# Patient Record
Sex: FEMALE | Race: ASIAN | Employment: FULL TIME | URBAN - METROPOLITAN AREA
[De-identification: names, ages, dates, MRNs, and addresses within clinical notes are randomized per-mention and may not be internally consistent; named-entity substitution may affect disease eponyms.]

---

## 2017-01-03 ENCOUNTER — OFFICE VISIT (OUTPATIENT)
Dept: INTERNAL MEDICINE CLINIC | Facility: CLINIC | Age: 27
End: 2017-01-03

## 2017-01-03 VITALS
SYSTOLIC BLOOD PRESSURE: 116 MMHG | DIASTOLIC BLOOD PRESSURE: 83 MMHG | TEMPERATURE: 98 F | BODY MASS INDEX: 31 KG/M2 | RESPIRATION RATE: 20 BRPM | HEART RATE: 75 BPM | WEIGHT: 187.88 LBS

## 2017-01-03 DIAGNOSIS — R51.9 HEADACHE, UNSPECIFIED HEADACHE TYPE: Primary | ICD-10-CM

## 2017-01-03 DIAGNOSIS — L65.9 HAIR LOSS: ICD-10-CM

## 2017-01-03 PROBLEM — R51 HEADACHE: Status: ACTIVE | Noted: 2017-01-03

## 2017-01-03 PROCEDURE — 99212 OFFICE O/P EST SF 10 MIN: CPT | Performed by: INTERNAL MEDICINE

## 2017-01-03 PROCEDURE — 99214 OFFICE O/P EST MOD 30 MIN: CPT | Performed by: INTERNAL MEDICINE

## 2017-01-03 RX ORDER — MELOXICAM 7.5 MG/1
7.5 TABLET ORAL DAILY
Qty: 30 TABLET | Refills: 0 | Status: SHIPPED | OUTPATIENT
Start: 2017-01-03

## 2017-01-03 NOTE — PROGRESS NOTES
HPI:    Patient ID: Agatha Cushing is a 31year old female. A very pleasant 30 y/o female patient comes to the office with a c/o headache. Headache   This is a new problem.  The current episode started more than 1 month ago (beginning of the October 201 Current Outpatient Prescriptions:  Meloxicam 7.5 MG Oral Tab Take 1 tablet (7.5 mg total) by mouth daily.  Disp: 30 tablet Rfl: 0   Ketoconazole 2 % Apply Externally Shampoo 2-3 x weekly as directed Disp: 120 mL Rfl: 10     Allergie This Visit:  Signed Prescriptions Disp Refills    Meloxicam 7.5 MG Oral Tab 30 tablet 0      Sig: Take 1 tablet (7.5 mg total) by mouth daily. Imaging & Referrals:  [facility] - INTERNAL       [place]#[id number]    Attention:  This note has been scribed by Theo MINOR

## 2017-01-27 ENCOUNTER — OFFICE VISIT (OUTPATIENT)
Dept: DERMATOLOGY CLINIC | Facility: CLINIC | Age: 27
End: 2017-01-27

## 2017-01-27 DIAGNOSIS — L65.9 ALOPECIA: ICD-10-CM

## 2017-01-27 DIAGNOSIS — L63.9 ALOPECIA AREATA: Primary | ICD-10-CM

## 2017-01-27 PROCEDURE — 99213 OFFICE O/P EST LOW 20 MIN: CPT | Performed by: DERMATOLOGY

## 2017-01-27 PROCEDURE — 99212 OFFICE O/P EST SF 10 MIN: CPT | Performed by: DERMATOLOGY

## 2017-01-27 RX ORDER — CLOBETASOL PROPIONATE 0.46 MG/ML
1 SOLUTION TOPICAL 2 TIMES DAILY
Qty: 50 ML | Refills: 6 | Status: SHIPPED | OUTPATIENT
Start: 2017-01-27 | End: 2018-01-27

## 2017-02-12 NOTE — PROGRESS NOTES
David Fitzgerald is a 31year old female. Patient presents with:  Hair/Scalp Problem: Patient presents with hair loss/bald patches. Patient gets migraines and notices hair loss where head hurts.              Review of patient's allergies indicates no Topics Concern    Caffeine Concern Yes    Comment: Coffee, 1 cup/week    Pt has a pacemaker No    Pt has a defibrillator No    Breast feeding No    Reaction to local anesthetic No     Social History Narrative     Family History   Problem Relation Age of On improving. Topical steroids o discrete to inject at this time. Biopsy not likely to be helpful.   Minimal inflammatory changes no evidence of scarring Verall hair regimen discussed including B. vitamin supplementation, iron as appropriate, adequate protei

## 2017-06-29 ENCOUNTER — OFFICE VISIT (OUTPATIENT)
Dept: INTERNAL MEDICINE CLINIC | Facility: CLINIC | Age: 27
End: 2017-06-29

## 2017-06-29 VITALS
TEMPERATURE: 98 F | HEIGHT: 66 IN | SYSTOLIC BLOOD PRESSURE: 97 MMHG | WEIGHT: 174 LBS | HEART RATE: 62 BPM | BODY MASS INDEX: 27.97 KG/M2 | DIASTOLIC BLOOD PRESSURE: 67 MMHG

## 2017-06-29 DIAGNOSIS — M79.604 RIGHT LEG PAIN: Primary | ICD-10-CM

## 2017-06-29 PROCEDURE — 99212 OFFICE O/P EST SF 10 MIN: CPT | Performed by: INTERNAL MEDICINE

## 2017-06-29 PROCEDURE — 99214 OFFICE O/P EST MOD 30 MIN: CPT | Performed by: INTERNAL MEDICINE

## 2017-06-29 NOTE — PROGRESS NOTES
HPI:    Patient ID: Doc Degree is a 31year old female. Leg Pain    The pain is present in the right knee, right lower leg and right ankle (noted on the anterolateral aspect of the right leg).  This is a chronic (right knee down to the right foot) proble present. No thyromegaly present. Cardiovascular: Normal rate, regular rhythm and normal heart sounds. No murmur heard. Pulmonary/Chest: Effort normal and breath sounds normal. No respiratory distress. She has no wheezes. She has no rales.    Abdominal

## 2017-07-26 ENCOUNTER — APPOINTMENT (OUTPATIENT)
Dept: LAB | Age: 27
End: 2017-07-26
Attending: OBSTETRICS & GYNECOLOGY
Payer: COMMERCIAL

## 2017-07-26 ENCOUNTER — OFFICE VISIT (OUTPATIENT)
Dept: OBGYN CLINIC | Facility: CLINIC | Age: 27
End: 2017-07-26

## 2017-07-26 VITALS
SYSTOLIC BLOOD PRESSURE: 120 MMHG | WEIGHT: 174 LBS | HEIGHT: 65 IN | BODY MASS INDEX: 28.99 KG/M2 | DIASTOLIC BLOOD PRESSURE: 84 MMHG | HEART RATE: 83 BPM

## 2017-07-26 DIAGNOSIS — R30.0 DYSURIA: ICD-10-CM

## 2017-07-26 DIAGNOSIS — Z34.91 UNCERTAIN DATES, ANTEPARTUM, FIRST TRIMESTER: Primary | ICD-10-CM

## 2017-07-26 DIAGNOSIS — Z32.01 PREGNANCY EXAMINATION OR TEST, POSITIVE RESULT: ICD-10-CM

## 2017-07-26 DIAGNOSIS — Z11.3 SCREEN FOR STD (SEXUALLY TRANSMITTED DISEASE): ICD-10-CM

## 2017-07-26 LAB
BILIRUB UR QL: NEGATIVE
CLARITY UR: CLEAR
COLOR UR: YELLOW
GLUCOSE UR-MCNC: NEGATIVE MG/DL
HGB UR QL STRIP.AUTO: NEGATIVE
KETONES UR-MCNC: NEGATIVE MG/DL
LEUKOCYTE ESTERASE UR QL STRIP.AUTO: NEGATIVE
NITRITE UR QL STRIP.AUTO: NEGATIVE
PH UR: 7 [PH] (ref 5–8)
PROT UR-MCNC: NEGATIVE MG/DL
SP GR UR STRIP: 1.01 (ref 1–1.03)
UROBILINOGEN UR STRIP-ACNC: <2
VIT C UR-MCNC: NEGATIVE MG/DL

## 2017-07-26 PROCEDURE — 81003 URINALYSIS AUTO W/O SCOPE: CPT

## 2017-07-26 PROCEDURE — 99213 OFFICE O/P EST LOW 20 MIN: CPT | Performed by: OBSTETRICS & GYNECOLOGY

## 2017-07-26 NOTE — PROGRESS NOTES
Los Delaney is a 31year old female [id number] Patient's last menstrual period was 05/05/2017. Patient presents with:  Gyn Exam: Eliana Olivier / PREGNANT  Patient is a 26yo female who presents for dyuria and pregnancy.   Pt feels a cramping pelvic pain and dysuri Solution, Apply 1 mL topically 2 (two) times daily. , Disp: 50 mL, Rfl: 6  •  Meloxicam 7.5 MG Oral Tab, Take 1 tablet (7.5 mg total) by mouth daily. , Disp: 30 tablet, Rfl: 0  •  Ketoconazole 2 % Apply Externally Shampoo, 2-3 x weekly as directed, Disp: 120 Gc/Ct ordered today for STD screening in pregnancy and for unprotected intercourse  3. US ordered today for dating due to uterus feeling enlarged and pt with spotting in June  -reviewed pregnancy care and how the group works.  Pt to see al providers- at [facility]

## 2017-07-28 ENCOUNTER — TELEPHONE (OUTPATIENT)
Dept: OBGYN CLINIC | Facility: CLINIC | Age: 27
End: 2017-07-28

## 2017-07-28 LAB
C TRACH DNA SPEC QL NAA+PROBE: NEGATIVE
N GONORRHOEA DNA SPEC QL NAA+PROBE: NEGATIVE

## 2017-07-28 NOTE — TELEPHONE ENCOUNTER
----- Message from Valentín Sage MD sent at 7/28/2017  3:01 PM CDT -----  Please let patient know that her Gc/Ct and UA were negative.

## 2017-08-10 ENCOUNTER — HOSPITAL ENCOUNTER (OUTPATIENT)
Dept: ULTRASOUND IMAGING | Age: 27
Discharge: HOME | End: 2017-08-10
Attending: OBSTETRICS & GYNECOLOGY
Payer: COMMERCIAL

## 2017-08-10 DIAGNOSIS — Z32.01 PREGNANCY EXAMINATION OR TEST, POSITIVE RESULT: ICD-10-CM

## 2017-08-10 DIAGNOSIS — Z34.91 UNCERTAIN DATES, ANTEPARTUM, FIRST TRIMESTER: ICD-10-CM

## 2017-08-10 PROCEDURE — 76801 OB US < 14 WKS SINGLE FETUS: CPT | Performed by: OBSTETRICS & GYNECOLOGY

## 2017-08-14 ENCOUNTER — TELEPHONE (OUTPATIENT)
Dept: PEDIATRICS CLINIC | Facility: CLINIC | Age: 27
End: 2017-08-14

## 2017-08-14 ENCOUNTER — TELEPHONE (OUTPATIENT)
Dept: OBGYN CLINIC | Facility: CLINIC | Age: 27
End: 2017-08-14

## 2017-08-14 NOTE — TELEPHONE ENCOUNTER
Pt informed that as of October 1st, we will not be accepting her insurance. Pt can either start care with us until 10/1/17, or call her insurance and find a doctor that will accept her insurance. Pt states she would like to start care with us for now.   P

## 2017-08-14 NOTE — TELEPHONE ENCOUNTER
mike from [employer] doesn't cover prenatals with DHA   Wants to know if generic or a different brand can be given

## 2017-08-14 NOTE — TELEPHONE ENCOUNTER
Per the pt she had an ultrasound last week, and is calling to find out when her next appt should be. Please advise.

## 2017-08-14 NOTE — TELEPHONE ENCOUNTER
PHARMACY SAID PT'S INSURANCE DOES NOT COVER PNV'S WITH DHA. ADVISED TO GIVE WHICHEVER PN VITAMIN HER INSURANCE WILL COVER AND TO GET AN OTC DHA SUPPLEMENT TO TAKE DAILY.

## 2017-08-15 ENCOUNTER — TELEPHONE (OUTPATIENT)
Dept: OBGYN CLINIC | Facility: CLINIC | Age: 27
End: 2017-08-15

## 2017-08-15 NOTE — TELEPHONE ENCOUNTER
lmtcb to notify pt of results. Pt already has [facility] appt scheduled on 8/21, can offer pt a sooner appt.

## 2017-08-15 NOTE — TELEPHONE ENCOUNTER
----- Message from Lisa Rodríguez MD sent at 8/14/2017  9:50 AM CDT -----  Please let patient know that her US shows IUP at 13w5d. It is normal IUP and she needs to start [facility] immediately. Please get her scheduled.

## 2017-08-21 ENCOUNTER — TELEPHONE (OUTPATIENT)
Dept: OBGYN CLINIC | Facility: CLINIC | Age: 27
End: 2017-08-21

## 2017-08-21 NOTE — TELEPHONE ENCOUNTER
Pt did not show for her [facility] appt today. Pt is already 16 weeks. When pt calls back, please help her reschedule her [facility] appt.

## 2017-10-27 RX ORDER — PRENATAL VIT,CAL 76/IRON/FOLIC 29 MG-1 MG
TABLET ORAL
Qty: 60 TABLET | Refills: 0 | OUTPATIENT
Start: 2017-10-27

## (undated) NOTE — MR AVS SNAPSHOT
[facility] [address]  [address], [address]  [address] [facility]  [phone]               Thank you for choosing us for your health care visit with KATELIN Betancourt MD.  We are glad to serve you and happy to provide you with this summar Your physician has referred you to a specialist.  Your physician or the clinic staff will provide you with the phone number you should call to schedule your appointment.      If you are confident that your benefit plan will not require a referral or authori Make half your plate fruits and vegetables Highly refined, white starches including white bread, rice and pasta   Eat plenty of protein, keep the fat content low Sugars:  sodas and sports drinks, candies and desserts   Eat plenty of low-fat dairy products

## (undated) NOTE — MR AVS SNAPSHOT
[facility] SPECIALTY [facility] [place] [address] [address] [place]  [address] [phone]               Thank you for choosing us for your health care visit with Monae Gonzalez MD.  We are glad to serve you and happy to provide you with this summary of CBC W Differential W Platelet    Complete by:  Jan 27, 2017 (Approximate)    Assoc Dx:   Alopecia areata [L63.9], Alopecia [L65.9]                 MyChart     Visit MyChart  You can access your MyChart to more actively manage your health care and vie